# Patient Record
Sex: MALE | Race: WHITE | Employment: FULL TIME | URBAN - METROPOLITAN AREA
[De-identification: names, ages, dates, MRNs, and addresses within clinical notes are randomized per-mention and may not be internally consistent; named-entity substitution may affect disease eponyms.]

---

## 2017-05-17 ENCOUNTER — APPOINTMENT (OUTPATIENT)
Dept: CT IMAGING | Age: 42
End: 2017-05-17
Attending: FAMILY MEDICINE
Payer: MEDICAID

## 2017-05-17 ENCOUNTER — HOSPITAL ENCOUNTER (OUTPATIENT)
Age: 42
Discharge: HOME | End: 2017-05-17
Attending: FAMILY MEDICINE
Payer: MEDICAID

## 2017-05-17 ENCOUNTER — OFFICE VISIT (OUTPATIENT)
Dept: FAMILY MEDICINE CLINIC | Facility: CLINIC | Age: 42
End: 2017-05-17

## 2017-05-17 VITALS
HEART RATE: 52 BPM | SYSTOLIC BLOOD PRESSURE: 110 MMHG | TEMPERATURE: 98 F | WEIGHT: 188 LBS | OXYGEN SATURATION: 96 % | DIASTOLIC BLOOD PRESSURE: 70 MMHG | RESPIRATION RATE: 16 BRPM

## 2017-05-17 VITALS
OXYGEN SATURATION: 100 % | HEIGHT: 75 IN | BODY MASS INDEX: 23.13 KG/M2 | DIASTOLIC BLOOD PRESSURE: 88 MMHG | SYSTOLIC BLOOD PRESSURE: 126 MMHG | HEART RATE: 78 BPM | RESPIRATION RATE: 18 BRPM | WEIGHT: 186 LBS | TEMPERATURE: 98 F

## 2017-05-17 DIAGNOSIS — M54.2 NECK PAIN ON RIGHT SIDE: Primary | ICD-10-CM

## 2017-05-17 DIAGNOSIS — R68.84 JAW PAIN: ICD-10-CM

## 2017-05-17 DIAGNOSIS — R68.84 PAIN IN LOWER JAW: Primary | ICD-10-CM

## 2017-05-17 DIAGNOSIS — H92.01 RIGHT EAR PAIN: ICD-10-CM

## 2017-05-17 PROCEDURE — 36415 COLL VENOUS BLD VENIPUNCTURE: CPT

## 2017-05-17 PROCEDURE — 99204 OFFICE O/P NEW MOD 45 MIN: CPT

## 2017-05-17 PROCEDURE — 70491 CT SOFT TISSUE NECK W/DYE: CPT | Performed by: FAMILY MEDICINE

## 2017-05-17 PROCEDURE — 80047 BASIC METABLC PNL IONIZED CA: CPT

## 2017-05-17 PROCEDURE — 85025 COMPLETE CBC W/AUTO DIFF WBC: CPT | Performed by: FAMILY MEDICINE

## 2017-05-17 RX ORDER — CLINDAMYCIN HYDROCHLORIDE 300 MG/1
300 CAPSULE ORAL 3 TIMES DAILY
Qty: 30 CAPSULE | Refills: 0 | Status: SHIPPED | OUTPATIENT
Start: 2017-05-17 | End: 2017-05-27

## 2017-05-17 RX ORDER — HYDROCODONE BITARTRATE AND ACETAMINOPHEN 5; 325 MG/1; MG/1
1 TABLET ORAL EVERY 6 HOURS PRN
Qty: 10 TABLET | Refills: 0 | Status: SHIPPED | OUTPATIENT
Start: 2017-05-17 | End: 2017-05-24

## 2017-05-17 NOTE — PROGRESS NOTES
CHIEF COMPLAINT:   Patient presents with:  Ear Pain: right ear pain started 2 weeks ago, jaw pain started 2 days sore throat started yesterday, having trouble openng mouth lower jaw that painful, states felt feverish/sweats today      HPI:   Andrae Fernandez bony landmarks intact. Left: tragus non tender on palpation, external auditory canal normal, TM: no erythema, no bulging, no retraction,no effusion; bony landmarks intact.   NOSE: nostrils patent, no nasal mucous, nasal mucosa pink and moist  THROAT: oral

## 2017-05-17 NOTE — ED PROVIDER NOTES
Patient Seen in: [facility]    History   Patient presents with:  Jaw Pain    Stated Complaint: 2 WKS EAR PAIN,JAW PAIN    HPI    35-year-old male presents for right jaw pain for past 2 weeks.   Since he has constant pain in the r atraumatic. Right Ear: External ear normal.   Left Ear: External ear normal.   Nose: Nose normal.   Mouth/Throat: Oropharynx is clear and moist.   Marked tenderness behind the right TMJ and below the right jaw.    Eyes: Conjunctivae and EOM are normal. Pu pain on right side  (primary encounter diagnosis)  Jaw pain    Disposition:  Discharge    Follow-up:  Nonstaff, Physician  [id number]    In 1 week  If symptoms worsen      Medications Prescribed:  Discharge Medication List as of

## (undated) NOTE — MR AVS SNAPSHOT
[address]  [address] [facility] [address]  [phone]               Thank you for choosing us for your health care visit with [facility]. We are glad to serve you and happy to provide you with this summary of your visit. Don’t eat while distracted and slow down. Avoid over sized portions. Don’t eat while when you’re bored.      EAT THESE FOODS MORE OFTEN: EAT THESE FOODS LESS OFTEN:   Make half your plate fruits and vegetables Highly refined, white starches including wh

## (undated) NOTE — ED AVS SNAPSHOT
[facility] in [address] [place] [address] Drive,[address]    [address] [facility]    Phone:  [phone]    Fax:  Farrukh Wallace   MRN: [id number]    Department:  [facility] in [facility]   Date of Visit:  5/17/2017 may not be covered by your plan. Please contact your insurance company to determine coverage for follow-up care and referrals. [facility]  [address] [facility], [address] [place] [address]  [phone] [address]  [address] prescription right away and begin taking the medication(s) as directed. If the Immediate Care Provider has read X-rays, these will be re-interpreted by a radiologist.  If there is a significant change in your reading, you will be contacted.  Please make Medicaid plans. To get signed up and covered, please call [phone] and ask to get set up for an insurance coverage that is in-network with [place] [address].         Imaging Results         CT SOFT TISSUE OF NECK(CONTRAST ONLY) (CPT=70491) (Gisselle No abnormality seen involving the TMJ, with the limits of the study. [employer]     Sign up for [employer], your secure online medical record.   [employer] will allow you to access patient instructions from your recent visit,  view other health informa